# Patient Record
Sex: MALE | Race: WHITE | Employment: UNEMPLOYED | URBAN - METROPOLITAN AREA
[De-identification: names, ages, dates, MRNs, and addresses within clinical notes are randomized per-mention and may not be internally consistent; named-entity substitution may affect disease eponyms.]

---

## 2019-10-26 ENCOUNTER — OFFICE VISIT (OUTPATIENT)
Dept: URGENT CARE | Facility: URGENT CARE | Age: 44
End: 2019-10-26
Payer: COMMERCIAL

## 2019-10-26 VITALS
HEART RATE: 83 BPM | TEMPERATURE: 100.3 F | DIASTOLIC BLOOD PRESSURE: 71 MMHG | OXYGEN SATURATION: 98 % | SYSTOLIC BLOOD PRESSURE: 117 MMHG | WEIGHT: 177 LBS

## 2019-10-26 DIAGNOSIS — J15.9 BRONCHOPNEUMONIA DUE TO BACTERIA: Primary | ICD-10-CM

## 2019-10-26 PROCEDURE — 99203 OFFICE O/P NEW LOW 30 MIN: CPT | Performed by: FAMILY MEDICINE

## 2019-10-26 RX ORDER — ALBUTEROL SULFATE 90 UG/1
2 AEROSOL, METERED RESPIRATORY (INHALATION) EVERY 4 HOURS PRN
Qty: 1 INHALER | Refills: 0 | Status: SHIPPED | OUTPATIENT
Start: 2019-10-26

## 2019-10-26 RX ORDER — AZITHROMYCIN 250 MG/1
TABLET, FILM COATED ORAL
Qty: 6 TABLET | Refills: 0 | Status: SHIPPED | OUTPATIENT
Start: 2019-10-26

## 2019-10-26 RX ORDER — IBUPROFEN 200 MG
200 TABLET ORAL EVERY 4 HOURS PRN
COMMUNITY

## 2019-10-26 NOTE — PROGRESS NOTES
Subjective: 10 days ago he got what felt like flu with chills and aches, then a day or 2 later congestion and sore throat and then cough.  He has continued to have the fevers and chills and now he has some pain on the right side, hurts to cough, a little short of breath, productive cough.  He does not have asthma and has no history of pneumonia.    Objective: Looks sick.  Not toxic.  ENT is normal.  Neck is normal.  Lungs are clear.  Heart is regular without murmurs.    Assessment and plan: This is fairly classic for right-sided pneumonia but I do not think a chest x-ray is necessary because even without it I would treat him with antibiotics.  I will give him an inhaler to see if it can help him be a little more effective with coughing.  Recheck in 2 days if not improved.  At that time I would do a chest x-ray if things seem unclear.

## 2019-11-05 ENCOUNTER — ANCILLARY PROCEDURE (OUTPATIENT)
Dept: GENERAL RADIOLOGY | Facility: CLINIC | Age: 44
End: 2019-11-05
Attending: PHYSICIAN ASSISTANT
Payer: COMMERCIAL

## 2019-11-05 ENCOUNTER — OFFICE VISIT (OUTPATIENT)
Dept: URGENT CARE | Facility: URGENT CARE | Age: 44
End: 2019-11-05
Payer: COMMERCIAL

## 2019-11-05 VITALS
OXYGEN SATURATION: 99 % | SYSTOLIC BLOOD PRESSURE: 112 MMHG | DIASTOLIC BLOOD PRESSURE: 77 MMHG | TEMPERATURE: 98.7 F | HEART RATE: 82 BPM | WEIGHT: 177 LBS

## 2019-11-05 DIAGNOSIS — R05.9 COUGH: ICD-10-CM

## 2019-11-05 DIAGNOSIS — R05.9 COUGH: Primary | ICD-10-CM

## 2019-11-05 DIAGNOSIS — R07.89 RIGHT-SIDED CHEST WALL PAIN: ICD-10-CM

## 2019-11-05 PROCEDURE — 71046 X-RAY EXAM CHEST 2 VIEWS: CPT

## 2019-11-05 PROCEDURE — 99213 OFFICE O/P EST LOW 20 MIN: CPT | Performed by: PHYSICIAN ASSISTANT

## 2019-11-05 RX ORDER — DOXYCYCLINE 100 MG/1
100 TABLET ORAL 2 TIMES DAILY
Qty: 14 TABLET | Refills: 0 | Status: SHIPPED | OUTPATIENT
Start: 2019-11-05 | End: 2019-11-12

## 2019-11-05 NOTE — PROGRESS NOTES
HPI:  Wilbert is a 45 yo male who presents for follow up after dx of pneumonia.  Patient seen in [facility] [place] 10/26/[place] for cough, fever, bodyaches and dx  With Pneumonia.  No CXR was done at that time.  He was prescribed Azithromycin 500mg x 3 days and an inhaler.  His sx  Improved with the antibx although he still has a bit of a hoarse voice.  He had coughing fits during the course of his illness.  His SOB has improved, but he continues to have a cough and now he is having Right sided rib pain with coughing.  No pain unless he is coughing. He points to location of rib pain as under his right pectoral m. along the anterior axillary line.  No hx of asthma.    ROS:  See HPI      PE:  Vitals & nursing notes reviewed. B/P: 112/77, T: 98.7, P: 82, R: Data Unavailable  Constitutional:  Alert, well nourished, well-developed, NAD  Head:  Atraumatic, normocephalic  Eyes:  Perrla, EOMI, conjunctiva:  Pink   Sclera:  Anicteric  Ears:  Canals clear BL, TM pearly BL  Throat:  No erythema, exudates, or edema to postoropharynx  Neck:  Supple, no cervical LAD  Lungs:  Mild expiratory wheeze that cleared with cough and then CTA.  No rhonchi, or rales  CV:  RRR,  no murmur appreciated  MS:  Mild TTP under right pectoralis m. -right anterior axillary line    CXR:  Possible infiltrate RLL??.  Await final radiology report.    ASSESSMENT:  (R05) Cough  (primary encounter diagnosis)  2. Right chest wall pain.  Comment:  Follow-up after clinical dx of pneumonia. Patient received Doxycycline at time of encounter due to possible subtle infiltrated seen on xray.  However Final radiology report is negative.  Patient called and message left re: results and instruction to discontinue doxy after 5 days.   (Permission given to leave detailed VM during encounter).  Right rib pain likely muscular, costocondritis or mild pleurisy from cough.  Plan: doxycycline monohydrate (ADOXA) 100 MG table - See orders  Rest.  Push fluids.  Cool mist humdifier.  Warm  liquids and/or honey for cough spasms and suppression. Patient declined Rx for tessalon perles.   Tylenol or advil for right -sided chest wall pain, HA, muscles aches, & fever PRN.   F/U with PCP in 7 days if sx persist, sooner if worsen.

## 2023-12-01 ENCOUNTER — APPOINTMENT (OUTPATIENT)
Dept: URBAN - METROPOLITAN AREA CLINIC 253 | Age: 48
Setting detail: DERMATOLOGY
End: 2023-12-01

## 2023-12-01 VITALS — RESPIRATION RATE: 14 BRPM | WEIGHT: 180 LBS | HEIGHT: 67 IN

## 2023-12-01 DIAGNOSIS — D22 MELANOCYTIC NEVI: ICD-10-CM

## 2023-12-01 DIAGNOSIS — L82.1 OTHER SEBORRHEIC KERATOSIS: ICD-10-CM

## 2023-12-01 DIAGNOSIS — L71.0 PERIORAL DERMATITIS: ICD-10-CM

## 2023-12-01 PROBLEM — D22.5 MELANOCYTIC NEVI OF TRUNK: Status: ACTIVE | Noted: 2023-12-01

## 2023-12-01 PROCEDURE — OTHER COUNSELING: OTHER

## 2023-12-01 PROCEDURE — OTHER MIPS QUALITY: OTHER

## 2023-12-01 PROCEDURE — OTHER PRESCRIPTION: OTHER

## 2023-12-01 PROCEDURE — OTHER ADDITIONAL NOTES: OTHER

## 2023-12-01 PROCEDURE — 99203 OFFICE O/P NEW LOW 30 MIN: CPT

## 2023-12-01 RX ORDER — METRONIDAZOLE 7.5 MG/G
0.75% GEL TOPICAL BID
Qty: 45 | Refills: 3 | Status: ERX | COMMUNITY
Start: 2023-12-01

## 2023-12-01 ASSESSMENT — LOCATION DETAILED DESCRIPTION DERM
LOCATION DETAILED: RIGHT LATERAL TRAPEZIAL NECK
LOCATION DETAILED: RIGHT MID-UPPER BACK
LOCATION DETAILED: RIGHT POSTERIOR SHOULDER
LOCATION DETAILED: RIGHT INFERIOR MEDIAL UPPER BACK
LOCATION DETAILED: LEFT LATERAL INFERIOR EYELID
LOCATION DETAILED: RIGHT LATERAL UPPER BACK
LOCATION DETAILED: LEFT INFERIOR LATERAL UPPER BACK

## 2023-12-01 ASSESSMENT — LOCATION ZONE DERM
LOCATION ZONE: TRUNK
LOCATION ZONE: ARM
LOCATION ZONE: EYELID
LOCATION ZONE: NECK

## 2023-12-01 ASSESSMENT — LOCATION SIMPLE DESCRIPTION DERM
LOCATION SIMPLE: RIGHT UPPER BACK
LOCATION SIMPLE: RIGHT SHOULDER
LOCATION SIMPLE: LEFT UPPER BACK
LOCATION SIMPLE: POSTERIOR NECK
LOCATION SIMPLE: LEFT INFERIOR EYELID

## 2023-12-01 NOTE — HPI: RASH
Is This A New Presentation, Or A Follow-Up?: Rash
Additional History: He tried Neosporin on the area a few times. It seems worse after showering. It has started to self resolve, though has not cleared. He denies itching, pain, or bleeding of this rash.

## 2023-12-01 NOTE — HPI: SKIN LESION
Is This A New Presentation, Or A Follow-Up?: Skin Lesions
Additional History: The patient notes a few scattered moles/lesions on the back and shoulders that he would like checked today.

## 2023-12-01 NOTE — PROCEDURE: ADDITIONAL NOTES
Detail Level: Simple
Additional Notes: This does appear to be resolving, to speed along this process I will send Rx for metronidazole. I encouraged the patient to apply this carefully and avoid getting this in the eye.
Render Risk Assessment In Note?: no
Additional Notes: Recommended FBE \\nA clinical assistant was present for the exam. Care instructions were explained to the patient in detail. Told patient to call with any concerns or questions. The patient verbalized understanding and agreement of the education provided and the treatment plan. Encouraged patient to schedule a follow up appointment right after visit. At the end of the visit, all questions had been answered and the patient was satisfied with the visit.